# Patient Record
Sex: MALE | Race: BLACK OR AFRICAN AMERICAN | NOT HISPANIC OR LATINO | URBAN - NONMETROPOLITAN AREA
[De-identification: names, ages, dates, MRNs, and addresses within clinical notes are randomized per-mention and may not be internally consistent; named-entity substitution may affect disease eponyms.]

---

## 2020-11-23 NOTE — PROCEDURE NOTE: CLINICAL
PROCEDURE NOTE: Removal of Corneal FB at Slit Lamp Anesthesia: Topical. The patient, the procedure, and the correct site were identified initially. Prior to treatment, the risks/benefits/alternatives were discussed. The patient wished to proceed with procedure. Corneal foreign body was removed using a 25 gauge needle at the slit lamp. Patient tolerated procedure well. There were no complications. Post-op instructions given. The residual rust ring was carefully buffed out of the corneal stroma. Patient tolerated procedure well. Fran Mckeon PROCEDURE NOTE: Bandage Contact Lens #1 OS. Diagnosis: Corneal Foreign Body. A therapeutic soft contact lens of the of the appropriate size and base curve was selected and then applied to the cornea. The lens fit well and moved appropriately. Fran Mckeon

## 2020-12-17 ENCOUNTER — IMPORTED ENCOUNTER (OUTPATIENT)
Dept: URBAN - NONMETROPOLITAN AREA CLINIC 1 | Facility: CLINIC | Age: 45
End: 2020-12-17

## 2020-12-17 PROBLEM — H54.41: Noted: 2020-12-17

## 2020-12-17 PROCEDURE — 92004 COMPRE OPH EXAM NEW PT 1/>: CPT

## 2020-12-17 NOTE — PATIENT DISCUSSION
blindness OD 2NDs/p crao od POSSIBLY DUE TO SEIZUREEDUCATE PT ON NEED FOR SEIZURE CONTROLMONITOR FOR CHANGE

## 2022-04-10 ASSESSMENT — TONOMETRY
OD_IOP_MMHG: 16
OS_IOP_MMHG: 15

## 2022-04-10 ASSESSMENT — VISUAL ACUITY: OS_CC: 20/30

## 2025-03-29 NOTE — PROCEDURE NOTE: CLINICAL
PROCEDURE NOTE: Removal of Corneal FB at Slit Lamp Anesthesia: Topical. The patient, the procedure, and the correct site were identified initially. Prior to treatment, the risks/benefits/alternatives were discussed. The patient wished to proceed with procedure. Corneal foreign body was removed using a 25 gauge needle at the slit lamp. Patient tolerated procedure well. There were no complications. Post-op instructions given. The residual rust ring was carefully buffed out of the corneal stroma. Patient tolerated procedure well. Fran Mckeon PROCEDURE NOTE: Bandage Contact Lens #1 OS. Diagnosis: Corneal Foreign Body. A therapeutic soft contact lens of the of the appropriate size and base curve was selected and then applied to the cornea. The lens fit well and moved appropriately. Fran Mckeon Start suboxone 8 mg  24 hours after fentanyl use.